# Patient Record
Sex: FEMALE | Race: NATIVE HAWAIIAN OR OTHER PACIFIC ISLANDER
[De-identification: names, ages, dates, MRNs, and addresses within clinical notes are randomized per-mention and may not be internally consistent; named-entity substitution may affect disease eponyms.]

---

## 2020-03-20 ENCOUNTER — HOSPITAL ENCOUNTER (OUTPATIENT)
Dept: HOSPITAL 67 - MAMMO | Age: 60
Discharge: HOME | End: 2020-03-20
Payer: COMMERCIAL

## 2020-03-20 DIAGNOSIS — Z12.31: Primary | ICD-10-CM

## 2021-05-21 ENCOUNTER — TELEPHONE ENCOUNTER (OUTPATIENT)
Dept: URBAN - METROPOLITAN AREA CLINIC 113 | Facility: CLINIC | Age: 61
End: 2021-05-21

## 2021-07-23 ENCOUNTER — OFFICE VISIT (OUTPATIENT)
Dept: URBAN - METROPOLITAN AREA CLINIC 107 | Facility: CLINIC | Age: 61
End: 2021-07-23
Payer: COMMERCIAL

## 2021-07-23 ENCOUNTER — WEB ENCOUNTER (OUTPATIENT)
Dept: URBAN - METROPOLITAN AREA CLINIC 107 | Facility: CLINIC | Age: 61
End: 2021-07-23

## 2021-07-23 VITALS
SYSTOLIC BLOOD PRESSURE: 114 MMHG | RESPIRATION RATE: 18 BRPM | HEIGHT: 66 IN | TEMPERATURE: 98 F | HEART RATE: 68 BPM | BODY MASS INDEX: 34.55 KG/M2 | DIASTOLIC BLOOD PRESSURE: 72 MMHG | WEIGHT: 215 LBS

## 2021-07-23 DIAGNOSIS — E88.01 ALPHA-1-ANTITRYPSIN DEFICIENCY: ICD-10-CM

## 2021-07-23 PROCEDURE — 99204 OFFICE O/P NEW MOD 45 MIN: CPT | Performed by: INTERNAL MEDICINE

## 2021-07-23 PROCEDURE — 99244 OFF/OP CNSLTJ NEW/EST MOD 40: CPT | Performed by: INTERNAL MEDICINE

## 2021-07-23 RX ORDER — RANOLAZINE 500 MG/1
1 TABLET TABLET, FILM COATED, EXTENDED RELEASE ORAL TWICE A DAY
Status: ACTIVE | COMMUNITY

## 2021-07-23 RX ORDER — FUROSEMIDE 20 MG/1
1 TABLET TABLET ORAL ONCE A DAY
Status: ACTIVE | COMMUNITY

## 2021-07-23 RX ORDER — ASPIRIN 81 MG/1
1 TABLET TABLET, COATED ORAL ONCE A DAY
Status: ACTIVE | COMMUNITY

## 2021-07-23 RX ORDER — OMEPRAZOLE 40 MG/1
1 CAPSULE 30 MINUTES BEFORE MORNING MEAL CAPSULE, DELAYED RELEASE ORAL ONCE A DAY
Status: ACTIVE | COMMUNITY

## 2021-07-23 NOTE — HPI-TODAY'S VISIT:
This is a 62 yo female with a history of small vessel coronary artery disease on Ranexa (followed by Dr. Tye Banda with cardiology), recent increase in pulmonary symptoms manifested as shortness of breath on exertion, noted to have alpha-1 anti-trypsin deficiency (level 29) with phenotpe ZZ, presenting to [facility] care. She tells me that she was taken off of her Ranexa last year by Dr. Banda. After this, she has been having increasing shortness of breath with exertion, especially with walking or riding her bike uphill. This prompted a cardiac workup with [facility] that revealed a 30 % blockage and resumption of Ranexa. She tells me she was also diagnosed with an aneurysm. I reviewed her cardiology records. Her LHC from  by Dr. Banda was notable for a 30% LAD stenosis. The LHC from 2021 by Dr. Briscoe revealed widely patent coronary arteries.  She also tells me that she has an aneurysm in her heart. I do not find mention of this on LHC or on echocardiogram. Nevertheless, she is currently undergoing evaluation by Dr. Aranda. This was evaluated on 21 with a CT of the chest that does not make mention of an aneurysm. It does however mention a filling defect in the left internal jugular, possibly a DVT. She is following up with Dr. Aranda regarding this finding. The chest CT was also notable for hepatic steatosis.  She tells me that her shortness of breath on exertion was evaluated by Dr. Perez with pulmonology, prompting a workup with blood work showing an alpha-1 antitrypsin deficiency, level 29 and phenotype ZZ. This is the reason for today's referral by Dr. Ryan Perez, and a copy of today's visit will be forwarded to him. A review of her records from Dr. Perez shows that her ALLEN was negative. Her abdominal US was unremarkable for any masses or lesions.  She admits a maternal grandmother who  from liver cirrhosis. This was non-alcoholic. She does not know if her grandmother had alpha-1 antitrypsin deficiency. Her mother is living, is 85 years old, has never had any liver disease noted, and has not been tested for alpha-1 antitrypsin deficiency. Her daughter was recently tested and was noted to have the deficiency. She also reports a maternal cousin who is having trouble with liver issues currently as well. There is no family history of colon cancer, polyps, pancreas disease or stomach cancer.   She is 61 years old and has never had a colonoscopy for colon cancer screening.  She does drink alcohol on occasion, approximately 1 to 2 drinks every other week. She denies any tobacco or drug use. She uses ibuprofen on rare occasion for a headache.   There is no dysphagia. She does admit heartburn, but this is controlled withe omeprazole 40 mg daily as needed. No abdominal pain, nausea or vomiting. No weight loss. Her bowels are moving daily. There is no blood per rectum. No jaundice or icterus. She denies any pruritus.

## 2021-07-27 LAB
A/G RATIO: 1.6
ACTIN (SMOOTH MUSCLE) ANTIBODY: 17
ALBUMIN: 4.6
ALKALINE PHOSPHATASE: 86
ALT (SGPT): 18
AST (SGOT): 20
BASO (ABSOLUTE): 0
BASOS: 0
BILIRUBIN, TOTAL: 0.4
BUN/CREATININE RATIO: 19
BUN: 18
CALCIUM: 9.7
CARBON DIOXIDE, TOTAL: 23
CHLORIDE: 104
CREATININE: 0.95
EGFR IF AFRICN AM: 75
EGFR IF NONAFRICN AM: 65
EOS (ABSOLUTE): 0.3
EOS: 7
FERRITIN, SERUM: 165
GLOBULIN, TOTAL: 2.8
GLUCOSE: 90
HBSAG SCREEN: NEGATIVE
HEMATOCRIT: 39.5
HEMATOLOGY COMMENTS:: (no result)
HEMOGLOBIN: 14
HEP A AB, TOTAL: NEGATIVE
HEP B CORE AB, TOT: NEGATIVE
HEP B SURFACE AB, QUAL: REACTIVE
HEP C VIRUS AB: <0.1
IMMATURE CELLS: (no result)
IMMATURE GRANS (ABS): 0
IMMATURE GRANULOCYTES: 0
INR: 1
IRON BIND.CAP.(TIBC): 327
IRON SATURATION: 41
IRON: 133
LIVER-KIDNEY MICROSOMAL AB: 0.7
LYMPHS (ABSOLUTE): 1.5
LYMPHS: 34
MCH: 32.9
MCHC: 35.4
MCV: 93
MITOCHONDRIAL (M2) ANTIBODY: <20
MONOCYTES(ABSOLUTE): 0.5
MONOCYTES: 10
NEUTROPHILS (ABSOLUTE): 2.2
NEUTROPHILS: 49
NRBC: (no result)
PLATELETS: 210
POTASSIUM: 4.2
PROTEIN, TOTAL: 7.4
PROTHROMBIN TIME: 10.9
RBC: 4.25
RDW: 12.3
SODIUM: 140
UIBC: 194
WBC: 4.4

## 2021-09-10 ENCOUNTER — OFFICE VISIT (OUTPATIENT)
Dept: URBAN - METROPOLITAN AREA CLINIC 107 | Facility: CLINIC | Age: 61
End: 2021-09-10
Payer: COMMERCIAL

## 2021-09-10 ENCOUNTER — WEB ENCOUNTER (OUTPATIENT)
Dept: URBAN - METROPOLITAN AREA CLINIC 107 | Facility: CLINIC | Age: 61
End: 2021-09-10

## 2021-09-10 VITALS
HEART RATE: 76 BPM | BODY MASS INDEX: 34.72 KG/M2 | TEMPERATURE: 98.2 F | WEIGHT: 216 LBS | HEIGHT: 66 IN | DIASTOLIC BLOOD PRESSURE: 74 MMHG | SYSTOLIC BLOOD PRESSURE: 107 MMHG | RESPIRATION RATE: 18 BRPM

## 2021-09-10 DIAGNOSIS — E88.01 ALPHA-1-ANTITRYPSIN DEFICIENCY: ICD-10-CM

## 2021-09-10 DIAGNOSIS — Z12.11 COLON CANCER SCREENING: ICD-10-CM

## 2021-09-10 DIAGNOSIS — I82.90 DEEP VEIN THROMBOSIS (DVT) OF NON-EXTREMITY VEIN, UNSPECIFIED CHRONICITY: ICD-10-CM

## 2021-09-10 DIAGNOSIS — K21.9 GASTROESOPHAGEAL REFLUX DISEASE, UNSPECIFIED WHETHER ESOPHAGITIS PRESENT: ICD-10-CM

## 2021-09-10 PROCEDURE — 99214 OFFICE O/P EST MOD 30 MIN: CPT | Performed by: INTERNAL MEDICINE

## 2021-09-10 RX ORDER — RANOLAZINE 500 MG/1
1 TABLET TABLET, FILM COATED, EXTENDED RELEASE ORAL TWICE A DAY
Status: ACTIVE | COMMUNITY

## 2021-09-10 RX ORDER — ASPIRIN 81 MG/1
1 TABLET TABLET, COATED ORAL ONCE A DAY
Status: ON HOLD | COMMUNITY

## 2021-09-10 RX ORDER — ASPIRIN 325 MG/1
1 TABLET TABLET, FILM COATED ORAL ONCE A DAY
OUTPATIENT

## 2021-09-10 RX ORDER — OMEPRAZOLE 40 MG/1
1 CAPSULE 30 MINUTES BEFORE MORNING MEAL CAPSULE, DELAYED RELEASE ORAL ONCE A DAY
Status: ACTIVE | COMMUNITY

## 2021-09-10 RX ORDER — OMEPRAZOLE 40 MG/1
1 CAPSULE 30 MINUTES BEFORE MORNING MEAL CAPSULE, DELAYED RELEASE ORAL ONCE A DAY
OUTPATIENT

## 2021-09-10 RX ORDER — ASPIRIN 325 MG/1
1 TABLET TABLET, FILM COATED ORAL ONCE A DAY
Status: ACTIVE | COMMUNITY

## 2021-09-10 RX ORDER — FUROSEMIDE 20 MG/1
1 TABLET TABLET ORAL ONCE A DAY
Status: ACTIVE | COMMUNITY

## 2021-09-10 NOTE — HPI-TODAY'S VISIT:
61-year-old female with history of small vessel coronary artery disease on Ranexa (followed by Dr. Tye Banda with cardiology, recent increase in pulmonary symptoms manifested as shortness of breath on exertion, noted to have alpha 1 antitrypsin deficiency (level 29) with phenotype ZZ, presenting for 6-week follow-up. She was last seen in the office on 7/23/2021 to establish care for alpha 1 antitrypsin deficiency with phenotype CBC.  She was asymptomatic from a GI standpoint.  She had an abdominal ultrasound in May 2021 was unremarkable.  She then had a CT of the chest on 7/22/2021 which was notable for a left internal jugular filling defect, questionable DVT, and hepatic steatosis.  I recommended additional labs to include autoimmune, hereditary and viral causes of liver disease.  I additionally repeated her LFTs.  She reported a family history of liver disease in her maternal grandmother with cirrhosis, as well as a daughter with alpha 1 antitrypsin deficiency.  I recommended routine labs with CBC and CMP every 6 months as well as abdominal ultrasound and AFP every 6 months to screen for cirrhosis and liver cancer.  Additionally, she was noted to be overdue for colon cancer screening.  This was deferred at last visit. Labs 7/23/2021:TIBC 327, iron 133, iron saturation 41, ferritin 165, hepatitis B surface antibody reactive, hepatitis B surface antigen negative, hepatitis B core antibody negative, hepatitis C antibody negative, hepatitis A total antibody negative, PT 10.9, INR 1, BUN 18, creatinine 0.95, sodium 140, potassium 4.2, AST 20, ALT 18, ALP 86, T bili 0.4, WBC 4.4, hemoglobin 14, MCV 93, platelets 210, liver kidney microsomal antibody negative, AMA negative, ASMA negative.  She tells me that Dr. Aranda started her on Eliquis for the left internal jugular vein DVT. Unfortunately, she had terrible nosebleeds  and did not tolerate the Eliquis. She is taking aspirin 325 mg daily. The etiology is unclear.  She has heartburn regularly. She is taking omeprazole 40 mg daily. She has breakthrough symptoms of heartburn when she forgets to take the omeprazole or eats something fried or spicy. There is no dysphagia. No nausea or vomiting. She has bowel movements daily. No blood per rectum. No jaundice or icterus. She has never had an EGD.

## 2021-12-07 ENCOUNTER — OFFICE VISIT (OUTPATIENT)
Dept: URBAN - METROPOLITAN AREA CLINIC 113 | Facility: CLINIC | Age: 61
End: 2021-12-07
Payer: COMMERCIAL

## 2021-12-07 ENCOUNTER — LAB OUTSIDE AN ENCOUNTER (OUTPATIENT)
Dept: URBAN - METROPOLITAN AREA CLINIC 113 | Facility: CLINIC | Age: 61
End: 2021-12-07

## 2021-12-07 ENCOUNTER — WEB ENCOUNTER (OUTPATIENT)
Dept: URBAN - METROPOLITAN AREA CLINIC 113 | Facility: CLINIC | Age: 61
End: 2021-12-07

## 2021-12-07 VITALS
WEIGHT: 203 LBS | SYSTOLIC BLOOD PRESSURE: 122 MMHG | TEMPERATURE: 98 F | DIASTOLIC BLOOD PRESSURE: 85 MMHG | HEIGHT: 66 IN | BODY MASS INDEX: 32.62 KG/M2 | HEART RATE: 88 BPM

## 2021-12-07 DIAGNOSIS — I82.90 DEEP VEIN THROMBOSIS (DVT) OF NON-EXTREMITY VEIN, UNSPECIFIED CHRONICITY: ICD-10-CM

## 2021-12-07 DIAGNOSIS — E88.01 ALPHA-1-ANTITRYPSIN DEFICIENCY: ICD-10-CM

## 2021-12-07 DIAGNOSIS — K21.9 GASTROESOPHAGEAL REFLUX DISEASE, UNSPECIFIED WHETHER ESOPHAGITIS PRESENT: ICD-10-CM

## 2021-12-07 DIAGNOSIS — Z12.11 COLON CANCER SCREENING: ICD-10-CM

## 2021-12-07 PROCEDURE — 99214 OFFICE O/P EST MOD 30 MIN: CPT | Performed by: INTERNAL MEDICINE

## 2021-12-07 RX ORDER — RANOLAZINE 500 MG/1
1 TABLET TABLET, FILM COATED, EXTENDED RELEASE ORAL TWICE A DAY
Status: ACTIVE | COMMUNITY

## 2021-12-07 RX ORDER — FUROSEMIDE 20 MG/1
1 TABLET TABLET ORAL ONCE A DAY
Status: ACTIVE | COMMUNITY

## 2021-12-07 RX ORDER — ASPIRIN 325 MG/1
1 TABLET TABLET, FILM COATED ORAL ONCE A DAY
Status: ACTIVE | COMMUNITY

## 2021-12-07 RX ORDER — SODIUM, POTASSIUM,MAG SULFATES 17.5-3.13G
354 ML SOLUTION, RECONSTITUTED, ORAL ORAL ONCE
Qty: 354 ML | Refills: 0 | OUTPATIENT
Start: 2021-12-07 | End: 2021-12-08

## 2021-12-07 RX ORDER — ASPIRIN 81 MG/1
1 TABLET TABLET, COATED ORAL ONCE A DAY
Status: ON HOLD | COMMUNITY

## 2021-12-07 RX ORDER — ASPIRIN 325 MG/1
1 TABLET TABLET, FILM COATED ORAL ONCE A DAY
OUTPATIENT

## 2021-12-07 RX ORDER — OMEPRAZOLE 40 MG/1
1 CAPSULE 30 MINUTES BEFORE MORNING MEAL CAPSULE, DELAYED RELEASE ORAL ONCE A DAY
Status: ACTIVE | COMMUNITY

## 2021-12-07 RX ORDER — OMEPRAZOLE 40 MG/1
1 CAPSULE 30 MINUTES BEFORE MORNING MEAL CAPSULE, DELAYED RELEASE ORAL ONCE A DAY
OUTPATIENT

## 2021-12-07 NOTE — HPI-TODAY'S VISIT:
61-year-old female with history of small vessel coronary artery disease on Ranexa (followed by Dr. Tye Banda with cardiology, recent increase in pulmonary symptoms manifested as shortness of breath on exertion, noted to have alpha 1 antitrypsin deficiency (level 29) with phenotype ZZ, presenting for 6-week follow-up. She was last seen in the office on 7/23/2021 to establish care for alpha 1 antitrypsin deficiency with phenotype CBC.  She was asymptomatic from a GI standpoint.  She had an abdominal ultrasound in May 2021 was unremarkable.  She then had a CT of the chest on 7/22/2021 which was notable for a left internal jugular filling defect, questionable DVT, and hepatic steatosis.  I recommended additional labs to include autoimmune, hereditary and viral causes of liver disease.  I additionally repeated her LFTs.  She reported a family history of liver disease in her maternal grandmother with cirrhosis, as well as a daughter with alpha 1 antitrypsin deficiency.  I recommended routine labs with CBC and CMP every 6 months as well as abdominal ultrasound and AFP every 6 months to screen for cirrhosis and liver cancer.  Additionally, she was noted to be overdue for colon cancer screening.  This was deferred at last visit. Labs 7/23/2021:TIBC 327, iron 133, iron saturation 41, ferritin 165, hepatitis B surface antibody reactive, hepatitis B surface antigen negative, hepatitis B core antibody negative, hepatitis C antibody negative, hepatitis A total antibody negative, PT 10.9, INR 1, BUN 18, creatinine 0.95, sodium 140, potassium 4.2, AST 20, ALT 18, ALP 86, T bili 0.4, WBC 4.4, hemoglobin 14, MCV 93, platelets 210, liver kidney microsomal antibody negative, AMA negative, ASMA negative.  She tells me that Dr. Aranda started her on Eliquis for the left internal jugular vein DVT. Unfortunately, she had terrible nosebleeds  and did not tolerate the Eliquis. She is taking aspirin 325 mg daily. The etiology is unclear. Her heartburn is better.  She takes the omeprazole every other day and has breakthrough maybe twice a week.  There is no dysphagia.  She denies any abdominal pain, nausea or vomiting.  She has a bowel movement everyday and has been no blood or melena.

## 2021-12-07 NOTE — HPI-OTHER HISTORIES
Abdominal ultrasound on 5/4/2021 revealed normal liver and gallbladder and 3 mm common bile duct.  Blood work on 10/8/2021 revealed a sodium 142 potassium 4.4 BUN 16 creatinine 0.84.  Total bili 0.3, AST 23, ALT 19, alkaline phosphatase 85.  Hemoglobin is 13.1, WBC is 4.9 and platelet count of 181,000.

## 2021-12-22 ENCOUNTER — OFFICE VISIT (OUTPATIENT)
Dept: URBAN - METROPOLITAN AREA SURGERY CENTER 25 | Facility: SURGERY CENTER | Age: 61
End: 2021-12-22
Payer: COMMERCIAL

## 2021-12-22 DIAGNOSIS — D12.3 ADENOMA OF TRANSVERSE COLON: ICD-10-CM

## 2021-12-22 DIAGNOSIS — Z12.11 AVERAGE RISK FOR CRC. DUE TO PT'S CO-MORBID STATE WITH END STAGE DEMENTIA, HIGH RISK FOR ANESTHESIA (PER NEUROLOGY); INABILITY TO TAKE A BOWEL PREP....WOULD NOT ADVISE ANY COLORECTAL CANCER SCREENING INCLUDING STOOL TEST FOR FECAL BLOOD.: ICD-10-CM

## 2021-12-22 PROCEDURE — G8907 PT DOC NO EVENTS ON DISCHARG: HCPCS | Performed by: INTERNAL MEDICINE

## 2021-12-22 PROCEDURE — 45385 COLONOSCOPY W/LESION REMOVAL: CPT | Performed by: INTERNAL MEDICINE

## 2021-12-22 RX ORDER — OMEPRAZOLE 40 MG/1
1 CAPSULE 30 MINUTES BEFORE MORNING MEAL CAPSULE, DELAYED RELEASE ORAL ONCE A DAY
Status: ACTIVE | COMMUNITY

## 2021-12-22 RX ORDER — RANOLAZINE 500 MG/1
1 TABLET TABLET, FILM COATED, EXTENDED RELEASE ORAL TWICE A DAY
Status: ACTIVE | COMMUNITY

## 2021-12-22 RX ORDER — FUROSEMIDE 20 MG/1
1 TABLET TABLET ORAL ONCE A DAY
Status: ACTIVE | COMMUNITY

## 2021-12-22 RX ORDER — ASPIRIN 81 MG/1
1 TABLET TABLET, COATED ORAL ONCE A DAY
Status: ON HOLD | COMMUNITY

## 2021-12-22 RX ORDER — ASPIRIN 325 MG/1
1 TABLET TABLET, FILM COATED ORAL ONCE A DAY
Status: ACTIVE | COMMUNITY

## 2022-01-20 ENCOUNTER — OFFICE VISIT (OUTPATIENT)
Dept: URBAN - METROPOLITAN AREA CLINIC 113 | Facility: CLINIC | Age: 62
End: 2022-01-20

## 2022-01-24 ENCOUNTER — DASHBOARD ENCOUNTERS (OUTPATIENT)
Age: 62
End: 2022-01-24

## 2022-01-24 ENCOUNTER — OFFICE VISIT (OUTPATIENT)
Dept: URBAN - METROPOLITAN AREA CLINIC 107 | Facility: CLINIC | Age: 62
End: 2022-01-24
Payer: COMMERCIAL

## 2022-01-24 VITALS
HEART RATE: 72 BPM | BODY MASS INDEX: 33.59 KG/M2 | DIASTOLIC BLOOD PRESSURE: 83 MMHG | TEMPERATURE: 97.6 F | SYSTOLIC BLOOD PRESSURE: 110 MMHG | WEIGHT: 209 LBS | RESPIRATION RATE: 18 BRPM | HEIGHT: 66 IN

## 2022-01-24 DIAGNOSIS — Z12.11 COLON CANCER SCREENING: ICD-10-CM

## 2022-01-24 DIAGNOSIS — I82.90 DEEP VEIN THROMBOSIS (DVT) OF NON-EXTREMITY VEIN, UNSPECIFIED CHRONICITY: ICD-10-CM

## 2022-01-24 DIAGNOSIS — K21.9 GASTROESOPHAGEAL REFLUX DISEASE, UNSPECIFIED WHETHER ESOPHAGITIS PRESENT: ICD-10-CM

## 2022-01-24 DIAGNOSIS — E88.01 ALPHA-1-ANTITRYPSIN DEFICIENCY: ICD-10-CM

## 2022-01-24 PROBLEM — 235595009: Status: ACTIVE | Noted: 2021-09-10

## 2022-01-24 PROBLEM — 305058001: Status: ACTIVE | Noted: 2021-07-23

## 2022-01-24 PROBLEM — 30188007: Status: ACTIVE | Noted: 2021-07-23

## 2022-01-24 PROBLEM — 128053003: Status: ACTIVE | Noted: 2021-09-10

## 2022-01-24 PROCEDURE — 99213 OFFICE O/P EST LOW 20 MIN: CPT | Performed by: NURSE PRACTITIONER

## 2022-01-24 RX ORDER — ASPIRIN 81 MG/1
1 TABLET TABLET, COATED ORAL ONCE A DAY
Status: DISCONTINUED | COMMUNITY

## 2022-01-24 RX ORDER — OMEPRAZOLE 40 MG/1
1 CAPSULE 30 MINUTES BEFORE MORNING MEAL CAPSULE, DELAYED RELEASE ORAL ONCE A DAY
OUTPATIENT

## 2022-01-24 RX ORDER — ASPIRIN 325 MG/1
1 TABLET TABLET, FILM COATED ORAL ONCE A DAY
OUTPATIENT

## 2022-01-24 RX ORDER — FUROSEMIDE 20 MG/1
1 TABLET TABLET ORAL ONCE A DAY
Status: ACTIVE | COMMUNITY

## 2022-01-24 RX ORDER — ASPIRIN 325 MG/1
1 TABLET TABLET, FILM COATED ORAL ONCE A DAY
Status: ACTIVE | COMMUNITY

## 2022-01-24 RX ORDER — RANOLAZINE 500 MG/1
1 TABLET TABLET, FILM COATED, EXTENDED RELEASE ORAL TWICE A DAY
Status: ACTIVE | COMMUNITY

## 2022-01-24 RX ORDER — OMEPRAZOLE 40 MG/1
1 CAPSULE 30 MINUTES BEFORE MORNING MEAL CAPSULE, DELAYED RELEASE ORAL ONCE A DAY
Status: ACTIVE | COMMUNITY

## 2022-01-24 NOTE — HPI-OTHER HISTORIES
Abdominal US 12/27/21: Increased echotexture of the liver possibly fatty infiltration.  No liver lesions.  Repeat ultrasound examination in 6 months  Colonoscopy was performed on 12/22/21. Excellent bowel preparation. Tortuous colon. Removal of a 4 mm tubular adenoma from the transverse colon. Normal rectum. A repeat colonoscopy is recommended in 5 years.  Abdominal ultrasound on 5/4/2021 revealed normal liver and gallbladder and 3 mm common bile duct.  Blood work on 10/8/2021 revealed a sodium 142 potassium 4.4 BUN 16 creatinine 0.84.  Total bili 0.3, AST 23, ALT 19, alkaline phosphatase 85.  Hemoglobin is 13.1, WBC is 4.9 and platelet count of 181,000.

## 2024-02-15 ENCOUNTER — HOSPITAL ENCOUNTER (OUTPATIENT)
Dept: HOSPITAL 67 - RAD | Age: 64
Discharge: HOME | End: 2024-02-15
Payer: COMMERCIAL

## 2024-02-15 DIAGNOSIS — R05.9: Primary | ICD-10-CM
